# Patient Record
Sex: MALE | Race: BLACK OR AFRICAN AMERICAN | Employment: FULL TIME | URBAN - METROPOLITAN AREA
[De-identification: names, ages, dates, MRNs, and addresses within clinical notes are randomized per-mention and may not be internally consistent; named-entity substitution may affect disease eponyms.]

---

## 2021-04-05 ENCOUNTER — HOSPITAL ENCOUNTER (EMERGENCY)
Age: 31
Discharge: HOME | End: 2021-04-05
Attending: EMERGENCY MEDICINE

## 2021-04-05 VITALS
BODY MASS INDEX: 25.93 KG/M2 | RESPIRATION RATE: 16 BRPM | SYSTOLIC BLOOD PRESSURE: 134 MMHG | OXYGEN SATURATION: 96 % | DIASTOLIC BLOOD PRESSURE: 80 MMHG | HEART RATE: 77 BPM | WEIGHT: 185.19 LBS | HEIGHT: 71 IN | TEMPERATURE: 98.2 F

## 2021-04-05 DIAGNOSIS — H57.12 ACUTE LEFT EYE PAIN: ICD-10-CM

## 2021-04-05 DIAGNOSIS — T15.92XA FOREIGN BODY OF LEFT EYE, INITIAL ENCOUNTER: Primary | ICD-10-CM

## 2021-04-05 PROCEDURE — 74011000250 HC RX REV CODE- 250: Performed by: EMERGENCY MEDICINE

## 2021-04-05 PROCEDURE — 99283 EMERGENCY DEPT VISIT LOW MDM: CPT

## 2021-04-05 RX ORDER — TETRACAINE HYDROCHLORIDE 5 MG/ML
1 SOLUTION OPHTHALMIC
Status: COMPLETED | OUTPATIENT
Start: 2021-04-05 | End: 2021-04-05

## 2021-04-05 RX ORDER — ERYTHROMYCIN 5 MG/G
OINTMENT OPHTHALMIC
Qty: 1 G | Refills: 0 | Status: SHIPPED | OUTPATIENT
Start: 2021-04-05 | End: 2021-04-12

## 2021-04-05 RX ORDER — NAPROXEN 500 MG/1
500 TABLET ORAL 2 TIMES DAILY WITH MEALS
Qty: 20 TAB | Refills: 0 | Status: SHIPPED | OUTPATIENT
Start: 2021-04-05 | End: 2021-04-15

## 2021-04-05 RX ORDER — LORATADINE 10 MG/1
10 TABLET ORAL
COMMUNITY

## 2021-04-05 RX ADMIN — TETRACAINE HYDROCHLORIDE 1 DROP: 5 SOLUTION OPHTHALMIC at 13:23

## 2021-04-05 RX ADMIN — FLUORESCEIN SODIUM 1 STRIP: 0.6 STRIP OPHTHALMIC at 13:23

## 2021-04-05 NOTE — DISCHARGE INSTRUCTIONS
Thank you for allowing us to provide you with medical care today. We realize that you have many choices for your emergency care needs. We thank you for choosing [facility]. Please choose us in the future for any continued health care needs. We hope we addressed all of your medical concerns. We strive to provide excellent quality care in the Emergency Department. Anything less than excellent does not meet our expectations. The exam and treatment you received in the Emergency Department were for an emergent problem and are not intended as complete care. It is important that you follow up with a doctor, nurse practitioner, or physician's assistant for ongoing care. If your symptoms worsen or you do not improve as expected and you are unable to reach your usual health care provider, you should return to the Emergency Department. We are available 24 hours a day. Take this sheet with you when you go to your follow-up visit. If you have any problem arranging the follow-up visit, contact the Emergency Department immediately. Make an appointment your family doctor for follow up of this visit. Return to the ER if you are unable to be seen in a timely manner.

## 2021-04-05 NOTE — ED TRIAGE NOTES
Pt presents to ED with c/o being struck in left eye by FB Chepe Sanders about one hour PTA. Pt was cutting c grass and was wearing safety goggles and FB went under the lense and struck the left eye.  There is ho hyphema seen on exam.

## 2021-04-05 NOTE — ED PROVIDER NOTES
29-year-old gentleman without any significant medical history presents to the emergency department today with chief complaint of left eye pain. He was cutting grass when he felt a sudden onset of pain and felt that struck him in his eye. He has been having difficulty opening his eye since that time. He denies any fevers or chills or other injuries. He has a foreign body sensation which has continued since the injury. The history is provided by the patient and medical records. Eye Injury   This is a new problem. The current episode started 1 to 2 hours ago. The problem occurs constantly. The problem has not changed since onset. The left eye is affected. The injury mechanism was a foreign body. The pain is severe. There is history of trauma to the eye. There is no known exposure to pink eye. He does not wear contacts. Associated symptoms include blurred vision, photophobia and pain. Pertinent negatives include no nausea, no vomiting, no weakness and no fever. History reviewed. No pertinent past medical history. Past Surgical History:   Procedure Laterality Date    [place] ABDOMEN SURGERY PROC UNLISTED           History reviewed. No pertinent family history.     Social History     Socioeconomic History    Marital status: Not on file     Spouse name: Not on file    Number of children: Not on file    Years of education: Not on file    Highest education level: Not on file   Occupational History    Not on file   Social Needs    Financial resource strain: Not on file    Food insecurity     Worry: Not on file     Inability: Not on file    Transportation needs     Medical: Not on file     Non-medical: Not on file   Tobacco Use    Smoking status: Current Every Day Smoker     Packs/day: 0.25    Smokeless tobacco: Never Used   Substance and Sexual Activity    Alcohol use: Yes     Comment: rare    Drug use: Not on file    Sexual activity: Not on file   Lifestyle    Physical activity     Days per week: Not on file     Minutes per session: Not on file    Stress: Not on file   Relationships    Social connections     Talks on phone: Not on file     Gets together: Not on file     Attends [employer] service: Not on file     Active member of club or organization: Not on file     Attends meetings of clubs or organizations: Not on file     Relationship status: Not on file    Intimate partner violence     Fear of current or ex partner: Not on file     Emotionally abused: Not on file     Physically abused: Not on file     Forced sexual activity: Not on file   Other Topics Concern    Not on file   Social History Narrative    Not on file         ALLERGIES: Patient has no known allergies. Review of Systems   Constitutional: Negative for fatigue and fever. HENT: Negative for sneezing and sore throat. Eyes: Positive for blurred vision, photophobia and pain. Respiratory: Negative for cough and shortness of breath. Cardiovascular: Negative for chest pain and leg swelling. Gastrointestinal: Negative for abdominal pain, diarrhea, nausea and vomiting. Genitourinary: Negative for difficulty urinating and dysuria. Musculoskeletal: Negative for arthralgias and myalgias. Skin: Negative for color change and rash. Neurological: Negative for weakness and headaches. Psychiatric/Behavioral: Negative for agitation and behavioral problems. Vitals:    04/05/21 1309   BP: 133/68   Pulse: 72   Resp: 16   Temp: 98.2 °F (36.8 °C)   SpO2: 95%   Weight: 84 kg (185 lb 3 oz)   Height: 5' 11\" (1.803 m)            Physical Exam  Vitals signs and nursing note reviewed. Constitutional:       General: He is not in acute distress. Appearance: Normal appearance. He is normal weight. He is not ill-appearing, toxic-appearing or diaphoretic. HENT:      Head: Normocephalic and atraumatic. Nose: Nose normal.      Mouth/Throat:      Mouth: Mucous membranes are moist.      Pharynx: Oropharynx is clear.    Eyes: General: Lids are everted, no foreign bodies appreciated. Right eye: No foreign body. Left eye: No foreign body. Extraocular Movements: Extraocular movements intact. Conjunctiva/sclera: Conjunctivae normal.      Pupils: Pupils are equal, round, and reactive to light. Left eye: No fluorescein uptake. Pa[place] exam negative. Neck:      Musculoskeletal: Normal range of motion and neck supple. No muscular tenderness. Cardiovascular:      Rate and Rhythm: Normal rate and regular rhythm. Pulses: Normal pulses. Pulmonary:      Effort: Pulmonary effort is normal. No respiratory distress. Musculoskeletal: Normal range of motion. General: No swelling, tenderness, deformity or signs of injury. Right lower leg: No edema. Left lower leg: No edema. Skin:     General: Skin is warm and dry. Capillary Refill: Capillary refill takes less than 2 seconds. Neurological:      General: No focal deficit present. Mental Status: He is alert and oriented to person, place, and time. Psychiatric:         Mood and Affect: Mood normal.         Behavior: Behavior normal.          MDM  Number of Diagnoses or Management Options  Diagnosis management comments: 72-year-old gentleman presents as above after foreign body injury to his left eye. His visual acuity is reassuring. Fluorescein exam did not reveal any corneal deficits. Plan to treat with anti-inflammatories as well as erythromycin ointment. I suspect he likely had a foreign body which caused local irritation.          Procedures

## 2023-05-15 RX ORDER — LORATADINE 10 MG/1
10 TABLET ORAL DAILY PRN
COMMUNITY